# Patient Record
Sex: FEMALE | Race: OTHER | NOT HISPANIC OR LATINO
[De-identification: names, ages, dates, MRNs, and addresses within clinical notes are randomized per-mention and may not be internally consistent; named-entity substitution may affect disease eponyms.]

---

## 2022-07-05 ENCOUNTER — APPOINTMENT (OUTPATIENT)
Dept: ORTHOPEDIC SURGERY | Facility: CLINIC | Age: 72
End: 2022-07-05

## 2022-07-25 PROBLEM — Z00.00 ENCOUNTER FOR PREVENTIVE HEALTH EXAMINATION: Status: ACTIVE | Noted: 2022-07-25

## 2022-09-13 ENCOUNTER — APPOINTMENT (OUTPATIENT)
Dept: ORTHOPEDIC SURGERY | Facility: CLINIC | Age: 72
End: 2022-09-13

## 2022-09-13 VITALS — WEIGHT: 153 LBS | HEIGHT: 62 IN | BODY MASS INDEX: 28.16 KG/M2

## 2022-09-13 PROCEDURE — 20610 DRAIN/INJ JOINT/BURSA W/O US: CPT | Mod: 50

## 2022-09-13 PROCEDURE — 99213 OFFICE O/P EST LOW 20 MIN: CPT | Mod: 25

## 2022-09-13 NOTE — HISTORY OF PRESENT ILLNESS
[de-identified] : Patient Complaint - bilateral knee pain did well from gel injections 01/04/2022 still little tenderness in the knee little\par difficulty with stair climbing she still taking meloxicam and Voltaren gel\par would consider this Synvisc a successful\par History of Present Illness\par pleasant 69-year-old woman retired with several years of pain in her knees left and right hypertensive does have gout\par elevated cholesterol takes meloxicam and also gabapentin. No recent gouty attacks. She did have Visco injection both\par of her knees couple years ago which gave her 8 months relief the pain is come back she thinks the pain is the same in\par both knees stairs are an issue as is getting out of a seated position he does have some poor endurance standing.\par Denies any back complaints. She presents today for bilateral Visco supplement injections\par

## 2022-09-13 NOTE — ASSESSMENT
[FreeTextEntry1] :  she did well from the gel injections pain is now returned she wants to proceed to repeat the gel injections into both knees   I  discussed with patient today the  option of a  Visco supplement injection to the knees.  risks and benefits were  reviewed a  consent was  given,  with sterile technique through a lateral approach the gel injections were delivered and  tolerated well. a Band-Aids was applied\par \par  I will see her back in 3 months  I explained if the gel fails at some point we might need to consider knee replacement\par

## 2022-09-13 NOTE — IMAGING
[de-identified] : Right Knee: Inspection of the knee is as follows: left and right knee have full extension mild limits in flexion slight\par varus minimal swelling pain mostly on the medial side. There is a little clicking on the right knee. Calves are soft patella\par track well extensor mechanisms intact negative Homans sign reflexes brisk and symmetric gait is nonantalgic.

## 2023-02-14 ENCOUNTER — APPOINTMENT (OUTPATIENT)
Dept: ORTHOPEDIC SURGERY | Facility: CLINIC | Age: 73
End: 2023-02-14
Payer: MEDICARE

## 2023-02-14 PROCEDURE — 99213 OFFICE O/P EST LOW 20 MIN: CPT

## 2023-02-14 NOTE — IMAGING
[de-identified] : Right Knee: Inspection of the knee is as follows: left and right knee have full extension mild limits in flexion slight\par varus minimal swelling pain mostly on the medial side. There is a little clicking on the right knee. Calves are soft patella\par track well extensor mechanisms intact negative Homans sign reflexes brisk and symmetric gait is nonantalgic.

## 2023-02-14 NOTE — REASON FOR VISIT
[FreeTextEntry2] : bilateral knee pain The had gel injections over 5 months ago pain trying to come back still some difficulty with stair climbing and extended standing  using Voltaren gel

## 2023-02-14 NOTE — ASSESSMENT
[FreeTextEntry1] :  has done quite well with gel injection please put request in to have it repeated  for both knees Voltaren gel continues I will see her in a couple months

## 2023-02-14 NOTE — HISTORY OF PRESENT ILLNESS
[de-identified] : Patient Complaint - bilateral knee pain did well from gel injections 01/04/2022 still little tenderness in the knee little\par difficulty with stair climbing she still taking meloxicam and Voltaren gel\par would consider this Synvisc a successful\par History of Present Illness\par pleasant 69-year-old woman retired with several years of pain in her knees left and right hypertensive does have gout\par elevated cholesterol takes meloxicam and also gabapentin. No recent gouty attacks. She did have Visco injection both\par of her knees couple years ago which gave her 8 months relief the pain is come back she thinks the pain is the same in\par both knees stairs are an issue as is getting out of a seated position he does have some poor endurance standing.\par Denies any back complaints. She presents today for bilateral Visco supplement injections\par

## 2023-04-25 ENCOUNTER — APPOINTMENT (OUTPATIENT)
Dept: ORTHOPEDIC SURGERY | Facility: CLINIC | Age: 73
End: 2023-04-25
Payer: MEDICARE

## 2023-04-25 PROCEDURE — 20610 DRAIN/INJ JOINT/BURSA W/O US: CPT | Mod: 50

## 2023-04-25 PROCEDURE — 99213 OFFICE O/P EST LOW 20 MIN: CPT | Mod: 25

## 2023-04-25 RX ORDER — MELOXICAM 15 MG/1
15 TABLET ORAL
Qty: 90 | Refills: 3 | Status: ACTIVE | COMMUNITY
Start: 2023-04-25 | End: 1900-01-01

## 2023-04-25 NOTE — IMAGING
[de-identified] : Right Knee: Inspection of the knee is as follows: left and right knee have full extension mild limits in flexion slight\par varus minimal swelling pain mostly on the medial side. There is a little clicking on the right knee. Calves are soft patella\par track well extensor mechanisms intact negative Homans sign reflexes brisk and symmetric gait is nonantalgic.

## 2023-04-25 NOTE — ASSESSMENT
[FreeTextEntry1] : here for gel   I  discussed with patient today the  option of a  Visco supplement injection to both knees.  risks and benefits were  reviewed a  consent was  given,  with sterile technique through a lateral approach the gel injection was delivered and  tolerated well. a Band-Aid were applied\par refilled mobic

## 2023-04-25 NOTE — HISTORY OF PRESENT ILLNESS
[de-identified] : Patient Complaint - bilateral knee pain did well from gel injections 01/04/2022 still little tenderness in the knee little\par difficulty with stair climbing she still taking meloxicam and Voltaren gel\par would consider this Synvisc a successful\par History of Present Illness\par pleasant 69-year-old woman retired with several years of pain in her knees left and right hypertensive does have gout\par elevated cholesterol takes meloxicam and also gabapentin. No recent gouty attacks. She did have Visco injection both\par of her knees couple years ago which gave her 8 months relief the pain is come back she thinks the pain is the same in\par both knees stairs are an issue as is getting out of a seated position he does have some poor endurance standing.\par Denies any back complaints. She presents today for bilateral Visco supplement injections\par

## 2023-07-14 ENCOUNTER — APPOINTMENT (OUTPATIENT)
Dept: ORTHOPEDIC SURGERY | Facility: CLINIC | Age: 73
End: 2023-07-14
Payer: MEDICARE

## 2023-07-14 PROCEDURE — 99213 OFFICE O/P EST LOW 20 MIN: CPT

## 2023-07-14 NOTE — ASSESSMENT
[FreeTextEntry1] :  did well from the gel injections  would probably do it in the fall discussed some physical therapy for a back some weight loss\par refilled  Voltaren gel

## 2023-07-14 NOTE — IMAGING
[de-identified] : Right Knee: Inspection of the knee is as follows: left and right knee have full extension mild limits in flexion slight\par varus minimal swelling pain mostly on the medial side. There is a little clicking on the right knee. Calves are soft patella\par track well extensor mechanisms intact negative Homans sign reflexes brisk and symmetric gait is nonantalgic.\par  lumbar spine pain with flexion extension some spasm negative straight leg bilaterally good hip motion bilaterally

## 2023-07-14 NOTE — HISTORY OF PRESENT ILLNESS
[de-identified] : Patient Complaint - bilateral knee pain did well from gel injections 01/04/2022 still little tenderness in the knee little\par difficulty with stair climbing she still taking meloxicam and Voltaren gel\par would consider this Synvisc a successful\par History of Present Illness\par pleasant 69-year-old woman retired with several years of pain in her knees left and right hypertensive does have gout\par elevated cholesterol takes meloxicam and also gabapentin. No recent gouty attacks. She did have Visco injection both\par of her knees couple years ago which gave her 8 months relief the pain is come back she thinks the pain is the same in\par both knees stairs are an issue as is getting out of a seated position he does have some poor endurance standing.\par Denies any back complaints. She presents today for bilateral Visco supplement injections\par

## 2023-07-14 NOTE — REASON FOR VISIT
[FreeTextEntry2] : bilateral knee pain  low back pain   had gel injections last visit which gave her good relief  taking the Mobic right now is a little bit more troubling weight is elevated still difficulty with stairs

## 2023-10-24 ENCOUNTER — APPOINTMENT (OUTPATIENT)
Dept: ORTHOPEDIC SURGERY | Facility: CLINIC | Age: 73
End: 2023-10-24
Payer: MEDICARE

## 2023-10-24 PROCEDURE — 99213 OFFICE O/P EST LOW 20 MIN: CPT | Mod: 25

## 2023-10-24 RX ORDER — DICLOFENAC SODIUM 1% 10 MG/G
1 GEL TOPICAL
Qty: 100 | Refills: 2 | Status: ACTIVE | COMMUNITY
Start: 2023-02-14 | End: 1900-01-01

## 2024-01-23 ENCOUNTER — RX RENEWAL (OUTPATIENT)
Age: 74
End: 2024-01-23

## 2024-01-23 ENCOUNTER — APPOINTMENT (OUTPATIENT)
Dept: ORTHOPEDIC SURGERY | Facility: CLINIC | Age: 74
End: 2024-01-23
Payer: MEDICARE

## 2024-01-23 PROCEDURE — 99213 OFFICE O/P EST LOW 20 MIN: CPT

## 2024-01-23 NOTE — REASON FOR VISIT
[Spouse] : spouse [FreeTextEntry2] : Patient is here for bilateral knee pain the gel injections last visit October were helpful has enough Mobic forgot how to do the exercises for the right heel pain which is still present still little difficulty with stairs interested in trying Pennsaid rather than Voltaren gel

## 2024-01-23 NOTE — HISTORY OF PRESENT ILLNESS
[de-identified] : Patient Complaint - bilateral knee pain did well from gel injections 01/04/2022 still little tenderness in the knee little\par  difficulty with stair climbing she still taking meloxicam and Voltaren gel\par  would consider this Synvisc a successful\par  History of Present Illness\par  pleasant 69-year-old woman retired with several years of pain in her knees left and right hypertensive does have gout\par  elevated cholesterol takes meloxicam and also gabapentin. No recent gouty attacks. She did have Visco injection both\par  of her knees couple years ago which gave her 8 months relief the pain is come back she thinks the pain is the same in\par  both knees stairs are an issue as is getting out of a seated position he does have some poor endurance standing.\par  Denies any back complaints. She presents today for bilateral Visco supplement injections\par

## 2024-01-23 NOTE — ASSESSMENT
[FreeTextEntry1] : Has enough Mobic demonstrated the exercises but also will send him for therapy for the knees and her heel I will see her in a few months we will repeat the gel injections wants to try Pennsaid instead of Voltaren gel

## 2024-01-23 NOTE — IMAGING
[de-identified] : Right Knee: Inspection of the knee is as follows: left and right knee have full extension mild limits in flexion slight\par  varus minimal swelling pain mostly on the medial side. There is a little clicking on the right knee. Calves are soft patella\par  track well extensor mechanisms intact negative Homans sign reflexes brisk and symmetric gait is nonantalgic.\par   lumbar spine pain with flexion extension some spasm negative straight leg bilaterally good hip motion bilaterally

## 2024-05-01 ENCOUNTER — APPOINTMENT (OUTPATIENT)
Dept: ORTHOPEDIC SURGERY | Facility: CLINIC | Age: 74
End: 2024-05-01
Payer: MEDICARE

## 2024-05-01 DIAGNOSIS — M72.2 PLANTAR FASCIAL FIBROMATOSIS: ICD-10-CM

## 2024-05-01 DIAGNOSIS — M17.0 BILATERAL PRIMARY OSTEOARTHRITIS OF KNEE: ICD-10-CM

## 2024-05-01 DIAGNOSIS — M51.9 UNSPECIFIED THORACIC, THORACOLUMBAR AND LUMBOSACRAL INTERVERTEBRAL DISC DISORDER: ICD-10-CM

## 2024-05-01 PROCEDURE — 20610 DRAIN/INJ JOINT/BURSA W/O US: CPT | Mod: 50

## 2024-05-01 PROCEDURE — 99213 OFFICE O/P EST LOW 20 MIN: CPT | Mod: 25

## 2024-05-01 NOTE — IMAGING
[de-identified] : Right Knee: Inspection of the knee is as follows: left and right knee have full extension mild limits in flexion slight varus minimal swelling pain mostly on the medial side. There is a little clicking on the right knee. Calves are soft patella track well extensor mechanisms intact negative Homans sign reflexes brisk and symmetric gait is nonantalgic.   lumbar spine pain with flexion extension some spasm negative straight leg bilaterally good hip motion bilaterally Right heel tenderness medial border calcaneus into the arch

## 2024-05-01 NOTE — HISTORY OF PRESENT ILLNESS
[de-identified] : Patient Complaint - bilateral knee pain did well from gel injections 01/04/2022 still little tenderness in the knee little\par  difficulty with stair climbing she still taking meloxicam and Voltaren gel\par  would consider this Synvisc a successful\par  History of Present Illness\par  pleasant 69-year-old woman retired with several years of pain in her knees left and right hypertensive does have gout\par  elevated cholesterol takes meloxicam and also gabapentin. No recent gouty attacks. She did have Visco injection both\par  of her knees couple years ago which gave her 8 months relief the pain is come back she thinks the pain is the same in\par  both knees stairs are an issue as is getting out of a seated position he does have some poor endurance standing.\par  Denies any back complaints. She presents today for bilateral Visco supplement injections\par

## 2024-05-01 NOTE — REASON FOR VISIT
[FreeTextEntry2] : Patient is here for bilateral knee Synvisc One injections pain still present in her heel still using Pennsaid

## 2024-05-01 NOTE — ASSESSMENT
[FreeTextEntry1] : Has enough Mobic demonstrated the exercises but also will send him for therapy for the knees and her heel I will see her in 3 months I  discussed with patient today the  option of a  Visco supplement injection to both knees.  risks and benefits were  reviewed a  consent was  given,  with sterile technique through a lateral approach the gel injection was delivered and  tolerated well. a Band-Aid was applied

## 2024-05-04 ENCOUNTER — APPOINTMENT (OUTPATIENT)
Dept: ORTHOPEDIC SURGERY | Facility: CLINIC | Age: 74
End: 2024-05-04

## 2024-06-24 ENCOUNTER — RX RENEWAL (OUTPATIENT)
Age: 74
End: 2024-06-24

## 2024-06-24 RX ORDER — DICLOFENAC SODIUM 20 MG/G
2 SOLUTION TOPICAL
Qty: 112 | Refills: 1 | Status: ACTIVE | COMMUNITY
Start: 2024-01-23 | End: 1900-01-01

## 2024-08-06 ENCOUNTER — APPOINTMENT (OUTPATIENT)
Dept: ORTHOPEDIC SURGERY | Facility: CLINIC | Age: 74
End: 2024-08-06

## 2024-08-06 PROCEDURE — G2211 COMPLEX E/M VISIT ADD ON: CPT

## 2024-08-06 PROCEDURE — 99213 OFFICE O/P EST LOW 20 MIN: CPT

## 2024-08-06 NOTE — IMAGING
[de-identified] : Right Knee: Inspection of the knee is as follows: left and right knee have full extension mild limits in flexion slight varus minimal swelling pain mostly on the medial side. There is a little clicking on the right knee. Calves are soft patella track well extensor mechanisms intact negative Homans sign reflexes brisk and symmetric gait is nonantalgic.   lumbar spine pain with flexion extension some spasm negative straight leg bilaterally good hip motion bilaterally Right heel tenderness medial border calcaneus into the arch

## 2024-08-06 NOTE — HISTORY OF PRESENT ILLNESS
[de-identified] : Patient Complaint - bilateral knee pain did well from gel injections 01/04/2022 still little tenderness in the knee little\par  difficulty with stair climbing she still taking meloxicam and Voltaren gel\par  would consider this Synvisc a successful\par  History of Present Illness\par  pleasant 69-year-old woman retired with several years of pain in her knees left and right hypertensive does have gout\par  elevated cholesterol takes meloxicam and also gabapentin. No recent gouty attacks. She did have Visco injection both\par  of her knees couple years ago which gave her 8 months relief the pain is come back she thinks the pain is the same in\par  both knees stairs are an issue as is getting out of a seated position he does have some poor endurance standing.\par  Denies any back complaints. She presents today for bilateral Visco supplement injections\par

## 2024-08-06 NOTE — IMAGING
[de-identified] : Right Knee: Inspection of the knee is as follows: left and right knee have full extension mild limits in flexion slight varus minimal swelling pain mostly on the medial side. There is a little clicking on the right knee. Calves are soft patella track well extensor mechanisms intact negative Homans sign reflexes brisk and symmetric gait is nonantalgic.   lumbar spine pain with flexion extension some spasm negative straight leg bilaterally good hip motion bilaterally Right heel tenderness medial border calcaneus into the arch

## 2024-08-06 NOTE — ASSESSMENT
[FreeTextEntry1] : refill Mobic demonstrated the exercises but also will send him for more physical therapy for the knees  I will see her in 3 months Expect to repeat the gel injection at the next visit

## 2024-08-06 NOTE — HISTORY OF PRESENT ILLNESS
[de-identified] : Patient Complaint - bilateral knee pain did well from gel injections 01/04/2022 still little tenderness in the knee little\par  difficulty with stair climbing she still taking meloxicam and Voltaren gel\par  would consider this Synvisc a successful\par  History of Present Illness\par  pleasant 69-year-old woman retired with several years of pain in her knees left and right hypertensive does have gout\par  elevated cholesterol takes meloxicam and also gabapentin. No recent gouty attacks. She did have Visco injection both\par  of her knees couple years ago which gave her 8 months relief the pain is come back she thinks the pain is the same in\par  both knees stairs are an issue as is getting out of a seated position he does have some poor endurance standing.\par  Denies any back complaints. She presents today for bilateral Visco supplement injections\par

## 2024-08-06 NOTE — REASON FOR VISIT
[FreeTextEntry2] : Patient is here for bilateral knee  More pain behind the right knee did some therapy for a while helped a little bit got tired of going her heel pain is better to put on 5 pounds still using Mobic occasional Voltaren solution and gabapentin Had gel injection both knees May 1 which was helpful

## 2024-09-18 ENCOUNTER — RX RENEWAL (OUTPATIENT)
Age: 74
End: 2024-09-18

## 2024-11-11 ENCOUNTER — APPOINTMENT (OUTPATIENT)
Dept: ORTHOPEDIC SURGERY | Facility: CLINIC | Age: 74
End: 2024-11-11
Payer: MEDICARE

## 2024-11-11 DIAGNOSIS — M72.2 PLANTAR FASCIAL FIBROMATOSIS: ICD-10-CM

## 2024-11-11 DIAGNOSIS — M17.0 BILATERAL PRIMARY OSTEOARTHRITIS OF KNEE: ICD-10-CM

## 2024-11-11 DIAGNOSIS — M51.9 UNSPECIFIED THORACIC, THORACOLUMBAR AND LUMBOSACRAL INTERVERTEBRAL DISC DISORDER: ICD-10-CM

## 2024-11-11 PROCEDURE — 20610 DRAIN/INJ JOINT/BURSA W/O US: CPT | Mod: 50

## 2024-11-11 PROCEDURE — 99213 OFFICE O/P EST LOW 20 MIN: CPT | Mod: 25

## 2025-01-02 ENCOUNTER — RX RENEWAL (OUTPATIENT)
Age: 75
End: 2025-01-02

## 2025-01-28 ENCOUNTER — RX RENEWAL (OUTPATIENT)
Age: 75
End: 2025-01-28

## 2025-02-04 ENCOUNTER — APPOINTMENT (OUTPATIENT)
Dept: ORTHOPEDIC SURGERY | Facility: CLINIC | Age: 75
End: 2025-02-04
Payer: MEDICARE

## 2025-02-04 DIAGNOSIS — M51.9 UNSPECIFIED THORACIC, THORACOLUMBAR AND LUMBOSACRAL INTERVERTEBRAL DISC DISORDER: ICD-10-CM

## 2025-02-04 DIAGNOSIS — M17.0 BILATERAL PRIMARY OSTEOARTHRITIS OF KNEE: ICD-10-CM

## 2025-02-04 DIAGNOSIS — M72.2 PLANTAR FASCIAL FIBROMATOSIS: ICD-10-CM

## 2025-02-04 PROCEDURE — G2211 COMPLEX E/M VISIT ADD ON: CPT

## 2025-02-04 PROCEDURE — 99213 OFFICE O/P EST LOW 20 MIN: CPT

## 2025-05-06 ENCOUNTER — APPOINTMENT (OUTPATIENT)
Dept: ORTHOPEDIC SURGERY | Facility: CLINIC | Age: 75
End: 2025-05-06
Payer: MEDICARE

## 2025-05-06 DIAGNOSIS — M51.9 UNSPECIFIED THORACIC, THORACOLUMBAR AND LUMBOSACRAL INTERVERTEBRAL DISC DISORDER: ICD-10-CM

## 2025-05-06 DIAGNOSIS — M17.0 BILATERAL PRIMARY OSTEOARTHRITIS OF KNEE: ICD-10-CM

## 2025-05-06 PROCEDURE — 99213 OFFICE O/P EST LOW 20 MIN: CPT | Mod: 25

## 2025-05-06 PROCEDURE — 20610 DRAIN/INJ JOINT/BURSA W/O US: CPT | Mod: 50

## 2025-08-11 ENCOUNTER — APPOINTMENT (OUTPATIENT)
Dept: ORTHOPEDIC SURGERY | Facility: CLINIC | Age: 75
End: 2025-08-11
Payer: MEDICARE

## 2025-08-11 DIAGNOSIS — M51.9 UNSPECIFIED THORACIC, THORACOLUMBAR AND LUMBOSACRAL INTERVERTEBRAL DISC DISORDER: ICD-10-CM

## 2025-08-11 DIAGNOSIS — M17.0 BILATERAL PRIMARY OSTEOARTHRITIS OF KNEE: ICD-10-CM

## 2025-08-11 DIAGNOSIS — M72.2 PLANTAR FASCIAL FIBROMATOSIS: ICD-10-CM

## 2025-08-11 PROCEDURE — 99213 OFFICE O/P EST LOW 20 MIN: CPT

## 2025-08-11 PROCEDURE — G2211 COMPLEX E/M VISIT ADD ON: CPT
